# Patient Record
Sex: FEMALE | Race: BLACK OR AFRICAN AMERICAN | Employment: UNEMPLOYED | URBAN - METROPOLITAN AREA
[De-identification: names, ages, dates, MRNs, and addresses within clinical notes are randomized per-mention and may not be internally consistent; named-entity substitution may affect disease eponyms.]

---

## 2020-02-29 ENCOUNTER — HOSPITAL ENCOUNTER (EMERGENCY)
Age: 25
Discharge: HOME | End: 2020-02-29
Attending: EMERGENCY MEDICINE

## 2020-02-29 VITALS
RESPIRATION RATE: 14 BRPM | DIASTOLIC BLOOD PRESSURE: 73 MMHG | TEMPERATURE: 97.8 F | OXYGEN SATURATION: 100 % | SYSTOLIC BLOOD PRESSURE: 133 MMHG | HEART RATE: 73 BPM

## 2020-02-29 LAB
BACTERIA WET PREP: ABNORMAL
BILIRUBIN URINE: NEGATIVE
BLOOD, URINE: NEGATIVE
CLARITY: CLEAR
CLUE CELLS: ABNORMAL
COLOR: YELLOW
EPITHELIAL CELLS WET PREP: ABNORMAL
GLUCOSE URINE: NEGATIVE MG/DL
HCG(URINE) PREGNANCY TEST: NEGATIVE
KETONES, URINE: NEGATIVE MG/DL
LEUKOCYTE ESTERASE, URINE: NEGATIVE
MICROSCOPIC EXAMINATION: NORMAL
NITRITE, URINE: NEGATIVE
PH UA: 8 (ref 5–8)
PROTEIN UA: NEGATIVE MG/DL
RBC WET PREP: ABNORMAL
SOURCE WET PREP: ABNORMAL
SPECIFIC GRAVITY UA: 1.01 (ref 1–1.03)
TRICHOMONAS PREP: ABNORMAL
URINE REFLEX TO CULTURE: NORMAL
URINE TYPE: NORMAL
UROBILINOGEN, URINE: 0.2 E.U./DL
WBC WET PREP: ABNORMAL
YEAST WET PREP: ABNORMAL

## 2020-02-29 PROCEDURE — 96372 THER/PROPH/DIAG INJ SC/IM: CPT

## 2020-02-29 PROCEDURE — 84703 CHORIONIC GONADOTROPIN ASSAY: CPT

## 2020-02-29 PROCEDURE — 81003 URINALYSIS AUTO W/O SCOPE: CPT

## 2020-02-29 PROCEDURE — 6360000002 HC RX W HCPCS: Performed by: EMERGENCY MEDICINE

## 2020-02-29 PROCEDURE — 6370000000 HC RX 637 (ALT 250 FOR IP): Performed by: EMERGENCY MEDICINE

## 2020-02-29 PROCEDURE — 87591 N.GONORRHOEAE DNA AMP PROB: CPT

## 2020-02-29 PROCEDURE — 87210 SMEAR WET MOUNT SALINE/INK: CPT

## 2020-02-29 PROCEDURE — 99283 EMERGENCY DEPT VISIT LOW MDM: CPT

## 2020-02-29 PROCEDURE — 87491 CHLMYD TRACH DNA AMP PROBE: CPT

## 2020-02-29 RX ORDER — METRONIDAZOLE 500 MG/1
500 TABLET ORAL 2 TIMES DAILY
Qty: 14 TABLET | Refills: 0 | Status: SHIPPED | OUTPATIENT
Start: 2020-02-29 | End: 2020-03-07

## 2020-02-29 RX ORDER — CEFTRIAXONE SODIUM 250 MG/1
250 INJECTION, POWDER, FOR SOLUTION INTRAMUSCULAR; INTRAVENOUS ONCE
Status: COMPLETED | OUTPATIENT
Start: 2020-02-29 | End: 2020-02-29

## 2020-02-29 RX ORDER — AZITHROMYCIN 250 MG/1
1000 TABLET, FILM COATED ORAL ONCE
Status: COMPLETED | OUTPATIENT
Start: 2020-02-29 | End: 2020-02-29

## 2020-02-29 RX ADMIN — CEFTRIAXONE SODIUM 250 MG: 250 INJECTION, POWDER, FOR SOLUTION INTRAMUSCULAR; INTRAVENOUS at 11:06

## 2020-02-29 RX ADMIN — AZITHROMYCIN MONOHYDRATE 1000 MG: 250 TABLET ORAL at 11:05

## 2020-02-29 ASSESSMENT — PAIN DESCRIPTION - ORIENTATION: ORIENTATION: LOWER

## 2020-02-29 ASSESSMENT — PAIN DESCRIPTION - PAIN TYPE: TYPE: ACUTE PAIN

## 2020-02-29 ASSESSMENT — PAIN DESCRIPTION - LOCATION: LOCATION: ABDOMEN

## 2020-02-29 ASSESSMENT — PAIN SCALES - GENERAL: PAINLEVEL_OUTOF10: 3

## 2020-02-29 NOTE — ED NOTES
Patient states that she is constipated. She states that her last BM was about a week ago. She states that she tried to take two capfuls of Miralax but doesn't think that she finished the whole thing because she didn't like it. She states that she would also like to be tested for STD's. When asked if she was having any symptoms she states that she has been lower abdominal pain.        Merlin Rigg, RN  02/29/20 2696

## 2020-02-29 NOTE — ED PROVIDER NOTES
CHIEF COMPLAINT  Constipation; Abdominal Pain; and Other (Wants to be tested for STD)      HISTORY OF PRESENT ILLNESS  Glenys Collet is a 22 y.o. female, who presents to the ED with a history of chronic intermittent constipation which began several months ago. Patient states she has not had a bowel movement in 1 week, although she has been passing gas. Patient has occasional nausea no vomiting. Patient notes suprapubic and left lower quadrant abdominal pain during periods of constipation which resolve when she has a bowel movement. Patient states that MiraLAX does relieve the constipation but then it recurs. Patient is also requesting check for STD. Patient has noted for the past 4 days vaginal itching mild in severity without odor, dysuria, hematuria, frequency, urgency, flank pain, back pain, fever patient is sexually active with a single male sexual partner last menstrual period was 2 weeks ago she does not use birth control or barrier protection she has a history of chlamydia at age 17 years she is [de-identified], P0. Review of Systems    I have reviewed the following from the nursing documentation. History reviewed. No pertinent past medical history. History reviewed. No pertinent surgical history. History reviewed. No pertinent family history.   Social History     Socioeconomic History    Marital status: Single     Spouse name: Not on file    Number of children: Not on file    Years of education: Not on file    Highest education level: Not on file   Occupational History    Not on file   Social Needs    Financial resource strain: Not on file    Food insecurity:     Worry: Not on file     Inability: Not on file    Transportation needs:     Medical: Not on file     Non-medical: Not on file   Tobacco Use    Smoking status: Current Every Day Smoker     Packs/day: 0.50     Types: Cigarettes    Smokeless tobacco: Never Used   Substance and Sexual Activity    Alcohol use: Yes     Comment: occas    Drug Performed at:  [facility]  [address],  [place] [facility] [place]   Phone [phone]   C. TRACHOMATIS N.GONORRHOEAE DNA   URINE RT REFLEX TO CULTURE    Narrative:     Performed at:  [facility]  [address] [facility] [place]   Phone [phone]   PREGNANCY, URINE    Narrative:     Performed at:  [facility]  [address] [facility] [place]   Phone [phone]  No orders to display       If EKG done, EKG was interpreted independently by me    PROCEDURES  Procedures    EDCOURSE/MDM  Patient seen and evaluated. Old records selectively reviewed if pertinent. Labs and imaging reviewed and results discussed with patient. I considered STD, UTI, vaginitis, bacterial vaginosis, chronic constipation. Discussion held with patient regarding treatment options for trichomoniasis. She wished treatment for gonorrhea and chlamydia as well. I will provide her with a prescription for Flagyl 500 mg twice a day for 1 week which will treat trichomoniasis as well as bacterial vaginosis which is also noted on the wet prep. The patient was given instructions regarding constipation, and advised to follow-up in the [facility]. outpatient clinic for further evaluation and management. She understands the need for follow-up. If Mia Villegas is discharged, I discussed with Mia Villegas and/or family the exam results, diagnosis, care, prognosis, reasons to return and the importance of follow up. Patient and/or family is in agreement with plan and all questions have been answered. Specific discharge instructions explained, including reasons to return to the emergency department. If discharged, patient was given scripts for the following medications.     New Prescriptions    METRONIDAZOLE (FLAGYL) 500 MG TABLET    Take 1 tablet by mouth 2 times daily for 7 days       CLINICAL IMPRESSION  1. Trichomonal vaginitis    2. Constipation, unspecified constipation type        /73   Pulse 73   Temp 97.8 °F (36.6 °C) (Oral)   Resp 14   LMP 02/15/2020   SpO2 100%     DISPOSITION  Vic Somers was discharged in stable condition.                    Nallely Guaman MD  02/29/20 111

## 2020-03-02 LAB
C TRACH DNA GENITAL QL NAA+PROBE: NEGATIVE
N. GONORRHOEAE DNA: NEGATIVE

## 2020-05-11 ENCOUNTER — HOSPITAL ENCOUNTER (EMERGENCY)
Age: 25
Discharge: HOME | End: 2020-05-11
Payer: COMMERCIAL

## 2020-05-11 VITALS
HEART RATE: 84 BPM | OXYGEN SATURATION: 99 % | WEIGHT: 196 LBS | DIASTOLIC BLOOD PRESSURE: 84 MMHG | TEMPERATURE: 98.1 F | SYSTOLIC BLOOD PRESSURE: 133 MMHG | HEIGHT: 64 IN | RESPIRATION RATE: 12 BRPM | BODY MASS INDEX: 33.46 KG/M2

## 2020-05-11 PROCEDURE — 99282 EMERGENCY DEPT VISIT SF MDM: CPT

## 2020-05-11 RX ORDER — CEPHALEXIN 500 MG/1
500 CAPSULE ORAL 2 TIMES DAILY
Qty: 10 CAPSULE | Refills: 0 | Status: SHIPPED | OUTPATIENT
Start: 2020-05-11 | End: 2020-05-16

## 2020-05-12 NOTE — ED NOTES
Pt dc/d with instructions, rx's and work note in stable condition, ambulatory to lobby. Home per ride.       Alexandra Almanza RN  05/11/20 2005

## 2020-06-28 ENCOUNTER — HOSPITAL ENCOUNTER (EMERGENCY)
Age: 25
Discharge: HOME | End: 2020-06-28
Attending: EMERGENCY MEDICINE

## 2020-06-28 VITALS
OXYGEN SATURATION: 100 % | SYSTOLIC BLOOD PRESSURE: 122 MMHG | BODY MASS INDEX: 34.01 KG/M2 | RESPIRATION RATE: 20 BRPM | TEMPERATURE: 97.6 F | DIASTOLIC BLOOD PRESSURE: 77 MMHG | WEIGHT: 199.2 LBS | HEART RATE: 59 BPM | HEIGHT: 64 IN

## 2020-06-28 LAB
BACTERIA WET PREP: ABNORMAL
BACTERIA: ABNORMAL /HPF
BILIRUBIN URINE: NEGATIVE
BLOOD, URINE: NEGATIVE
CLARITY: ABNORMAL
CLUE CELLS: ABNORMAL
COLOR: YELLOW
EPITHELIAL CELLS WET PREP: ABNORMAL
EPITHELIAL CELLS, UA: ABNORMAL /HPF (ref 0–5)
GLUCOSE URINE: NEGATIVE MG/DL
HCG(URINE) PREGNANCY TEST: NEGATIVE
KETONES, URINE: NEGATIVE MG/DL
LEUKOCYTE ESTERASE, URINE: ABNORMAL
MICROSCOPIC EXAMINATION: YES
NITRITE, URINE: NEGATIVE
PH UA: 7.5 (ref 5–8)
PROTEIN UA: NEGATIVE MG/DL
RBC UA: ABNORMAL /HPF (ref 0–4)
RBC WET PREP: ABNORMAL
SOURCE WET PREP: ABNORMAL
SPECIFIC GRAVITY UA: 1.01 (ref 1–1.03)
TRICHOMONAS PREP: ABNORMAL
URINE REFLEX TO CULTURE: ABNORMAL
URINE TYPE: ABNORMAL
UROBILINOGEN, URINE: 0.2 E.U./DL
WBC UA: ABNORMAL /HPF (ref 0–5)
WBC WET PREP: ABNORMAL
YEAST WET PREP: ABNORMAL

## 2020-06-28 PROCEDURE — 87210 SMEAR WET MOUNT SALINE/INK: CPT

## 2020-06-28 PROCEDURE — 87591 N.GONORRHOEAE DNA AMP PROB: CPT

## 2020-06-28 PROCEDURE — 87491 CHLMYD TRACH DNA AMP PROBE: CPT

## 2020-06-28 PROCEDURE — 99283 EMERGENCY DEPT VISIT LOW MDM: CPT

## 2020-06-28 PROCEDURE — 84703 CHORIONIC GONADOTROPIN ASSAY: CPT

## 2020-06-28 PROCEDURE — 81001 URINALYSIS AUTO W/SCOPE: CPT

## 2020-06-28 RX ORDER — METRONIDAZOLE 500 MG/1
500 TABLET ORAL 2 TIMES DAILY
Qty: 14 TABLET | Refills: 0 | Status: SHIPPED | OUTPATIENT
Start: 2020-06-28 | End: 2020-07-05

## 2020-06-28 NOTE — ED PROVIDER NOTES
member of club or organization: Not on file     Attends meetings of clubs or organizations: Not on file     Relationship status: Not on file    Intimate partner violence     Fear of current or ex partner: Not on file     Emotionally abused: Not on file     Physically abused: Not on file     Forced sexual activity: Not on file   Other Topics Concern    Not on file   Social History Narrative    Not on file     No current facility-administered medications for this encounter. Current Outpatient Medications   Medication Sig Dispense Refill    metroNIDAZOLE (FLAGYL) 500 MG tablet Take 1 tablet by mouth 2 times daily for 7 days 14 tablet 0     No Known Allergies       PHYSICAL EXAM  /77   Pulse 59   Temp 97.6 °F (36.4 °C) (Oral)   Resp 20   Ht 5' 4\" (1.626 m)   Wt 90.4 kg (199 lb 3.2 oz)   SpO2 100%   BMI 34.19 kg/m²   Physical Exam   GENERAL APPEARANCE: Awake and alert. Cooperative. In no acute distress. EYES: PERRL. Corneas clear. Sclera non icteric. No conjunctival injection  LUNGS: Clear. Equal breath sounds bilaterally. CARDIOVASCULAR: RRR. No murmurs rubs or gallops. ABDOMEN: Soft non tender. No guarding or rebound. EXTREMITIES:  Moves all extremities equally. SKIN: Warm and dry.          LABORATORY STUDIES:   Labs Reviewed   WET PREP, GENITAL - Abnormal; Notable for the following components:       Result Value    Clue Cells, Wet Prep 3+ (*)     All other components within normal limits    Narrative:     Performed at:  [facility]  [employer],  [place]   Phone [phone]   URINE RT REFLEX TO CULTURE - Abnormal; Notable for the following components:    Clarity, UA SL CLOUDY (*)     Leukocyte Esterase, Urine SMALL (*)     All other components within normal limits    Narrative:     Performed at:  [facility]  [employer] [id number] [address],  [place]   Phone [phone]   MICROSCOPIC

## 2020-06-29 LAB
C TRACH DNA GENITAL QL NAA+PROBE: POSITIVE
N. GONORRHOEAE DNA: NEGATIVE

## 2020-09-28 ENCOUNTER — HOSPITAL ENCOUNTER (EMERGENCY)
Age: 25
Discharge: HOME | End: 2020-09-28
Attending: EMERGENCY MEDICINE

## 2020-09-28 VITALS
SYSTOLIC BLOOD PRESSURE: 117 MMHG | BODY MASS INDEX: 34.23 KG/M2 | WEIGHT: 199.4 LBS | DIASTOLIC BLOOD PRESSURE: 87 MMHG | OXYGEN SATURATION: 99 % | HEART RATE: 77 BPM | RESPIRATION RATE: 15 BRPM | TEMPERATURE: 99 F

## 2020-09-28 PROCEDURE — 99281 EMR DPT VST MAYX REQ PHY/QHP: CPT

## 2020-09-28 RX ORDER — VALACYCLOVIR HYDROCHLORIDE 500 MG/1
500 TABLET, FILM COATED ORAL 2 TIMES DAILY
Qty: 6 TABLET | Refills: 1 | Status: SHIPPED | OUTPATIENT
Start: 2020-09-28 | End: 2020-10-01

## 2020-09-28 ASSESSMENT — PAIN SCALES - GENERAL: PAINLEVEL_OUTOF10: 10

## 2020-09-28 ASSESSMENT — PAIN DESCRIPTION - LOCATION: LOCATION: VAGINA

## 2020-09-28 NOTE — ED PROVIDER NOTES
and OX3. Speech clear and appropriate. No extremity weakness. Normal sensation in all extremities. No facial asymmetry. Gait normal.  Psych:   Affect normal. Mood normal  :   Pelvic exam was deferred by the patient      RADIOLOGY:      LAB      ED COURSE / MDM:  23-year-old female with previous history of vaginal herpes presents with complaints of vaginal pain secondary to vaginal herpes outbreak that started 2 days ago. She was seen last week at [facility] with pelvic pain and UTI symptoms but no discharge. She did test positive for gonorrhea and chlamydia. Urinalysis showed some pyuria. She was treated with Rocephin and azithromycin and given a prescription for Macrobid. Symptoms have the vaginal herpes started 2 days ago. She declines pelvic exam.  She was given a prescription for a 3-day course of Valtrex with 1 refill in case she has recurrent outbreaks symptoms. Advise follow-up with GYN or 1 of the local STD clinics. I discussed with Fernando Berumen the results of evaluation in the Emergency Department, diagnosis, care and prognosis. The plan is to discharge to home. The patient is in agreement with the plan and questions have been answered. I also discussed with the patient and/or family the reasons which may require a return visit and the importance of follow-up care.        (Please note that portions of this note may have been completed with a voice recognition program.  Efforts were made to edit the dictation but occasionally words are mis-transcribed)        FINAL IMPRESSION:  1 --HSV infection of the vagina                   Timothy Sandoval MD  09/28/20 0525

## 2020-09-28 NOTE — ED NOTES
Pt states understanding of d/c instructions and medications. Pt alert and oriented with steady gait upon discharge.       Anna Parra RN  09/28/20 1252

## 2020-10-19 ENCOUNTER — HOSPITAL ENCOUNTER (EMERGENCY)
Age: 25
Discharge: HOME | End: 2020-10-19
Attending: EMERGENCY MEDICINE
Payer: MEDICAID

## 2020-10-19 VITALS
RESPIRATION RATE: 16 BRPM | SYSTOLIC BLOOD PRESSURE: 137 MMHG | BODY MASS INDEX: 34.4 KG/M2 | TEMPERATURE: 97.8 F | WEIGHT: 200.4 LBS | DIASTOLIC BLOOD PRESSURE: 81 MMHG | HEART RATE: 103 BPM | OXYGEN SATURATION: 97 %

## 2020-10-19 LAB
BACTERIA WET PREP: NORMAL
CLUE CELLS: NORMAL
EPITHELIAL CELLS WET PREP: NORMAL
RBC WET PREP: NORMAL
SOURCE WET PREP: NORMAL
TRICHOMONAS PREP: NORMAL
WBC WET PREP: NORMAL
YEAST WET PREP: NORMAL

## 2020-10-19 PROCEDURE — 6360000002 HC RX W HCPCS: Performed by: EMERGENCY MEDICINE

## 2020-10-19 PROCEDURE — 99283 EMERGENCY DEPT VISIT LOW MDM: CPT

## 2020-10-19 PROCEDURE — 96372 THER/PROPH/DIAG INJ SC/IM: CPT

## 2020-10-19 PROCEDURE — 87591 N.GONORRHOEAE DNA AMP PROB: CPT

## 2020-10-19 PROCEDURE — 87491 CHLMYD TRACH DNA AMP PROBE: CPT

## 2020-10-19 PROCEDURE — 87210 SMEAR WET MOUNT SALINE/INK: CPT

## 2020-10-19 RX ORDER — METRONIDAZOLE 500 MG/1
2000 TABLET ORAL ONCE
Qty: 4 TABLET | Refills: 0 | Status: SHIPPED | OUTPATIENT
Start: 2020-10-19 | End: 2020-10-19

## 2020-10-19 RX ORDER — CEFTRIAXONE SODIUM 250 MG/1
250 INJECTION, POWDER, FOR SOLUTION INTRAMUSCULAR; INTRAVENOUS ONCE
Status: COMPLETED | OUTPATIENT
Start: 2020-10-19 | End: 2020-10-19

## 2020-10-19 RX ORDER — AZITHROMYCIN 250 MG/1
1000 TABLET, FILM COATED ORAL ONCE
Qty: 4 TABLET | Refills: 0 | Status: SHIPPED | OUTPATIENT
Start: 2020-10-19 | End: 2020-10-19

## 2020-10-19 RX ADMIN — CEFTRIAXONE SODIUM 250 MG: 250 INJECTION, POWDER, FOR SOLUTION INTRAMUSCULAR; INTRAVENOUS at 02:39

## 2020-10-19 NOTE — ED NOTES
Pt to ed with c/o retained condom x 25 min. Tonight.  Exam per MD.      Cory Davenport, RN  10/19/20 9119

## 2020-10-19 NOTE — ED PROVIDER NOTES
[facility]   eMERGENCY dEPARTMENT eNCOUnter      Pt Name: Petar Antoine  MRN: [id number]  [place] 1995  Date of evaluation: 10/19/2020  Provider: Sukhdeep Hunt MD  PCP: No primary care provider on file. CHIEF COMPLAINT       Chief Complaint   Patient presents with    Foreign Body     retained condom       HISTORY OFPRESENT ILLNESS   (Location/Symptom, Timing/Onset, Context/Setting, Quality, Duration, Modifying Factors,Severity)  Note limiting factors. Petar Antoine is a 26 y.o. female she was having intercourse condom came off this happened about 20 to 30 minutes ago she cannot reach it she is also requesting STD prophylaxis    Nursing Notes were all reviewed and agreed with or any disagreements were addressed  in the HPI. REVIEW OF SYSTEMS    (2-9 systems for level 4, 10 or more for level 5)     Review of Systems    Positives and Pertinent negatives as per HPI. Except as noted above in the ROS, all other systems were reviewed andnegative. PASTMEDICAL HISTORY     Past Medical History:   Diagnosis Date    Genital herpes          SURGICAL HISTORY       Past Surgical History:   Procedure Laterality Date    TONSILLECTOMY           CURRENT MEDICATIONS       Previous Medications    No medications on file       ALLERGIES     Patient has no known allergies. FAMILY HISTORY     No family history on file.        SOCIAL HISTORY       Social History     Socioeconomic History    Marital status: Single     Spouse name: Not on file    Number of children: Not on file    Years of education: Not on file    Highest education level: Not on file   Occupational History    Not on file   Social Needs    Financial resource strain: Not on file    Food insecurity     Worry: Not on file     Inability: Not on file    Transportation needs     Medical: Not on file     Non-medical: Not on file   Tobacco Use    Smoking status: Current Every Day Smoker     Packs/day: 0.50     Types: Cigarettes    Smokeless tobacco: Never Used   Substance and Sexual Activity    Alcohol use: Yes     Comment: occas    Drug use: Yes     Types: Marijuana    Sexual activity: Yes     Partners: Male   Lifestyle    Physical activity     Days per week: Not on file     Minutes per session: Not on file    Stress: Not on file   Relationships    Social connections     Talks on phone: Not on file     Gets together: Not on file     Attends Christian service: Not on file     Active member of club or organization: Not on file     Attends meetings of clubs or organizations: Not on file     Relationship status: Not on file    Intimate partner violence     Fear of current or ex partner: Not on file     Emotionally abused: Not on file     Physically abused: Not on file     Forced sexual activity: Not on file   Other Topics Concern    Not on file   Social History Narrative    Not on file       SCREENINGS      @SPBW([id number])@      PHYSICAL EXAM    (up to 7 for level 4, 8 or more for level 5)     ED Triage Vitals [10/19/20 0228]   BP Temp Temp Source Pulse Resp SpO2 Height Weight   137/81 97.8 °F (36.6 °C) Oral 103 16 97 % -- 200 lb 6.4 oz (90.9 kg)       Physical Exam      General Appearance:  Alert, cooperative, no distress, appears stated age. Head:  Normocephalic, without obviousabnormality, atraumatic. Eyes:  conjunctiva/corneas clear, EOM's intact. Sclera anicteric. ENT: Mucous membranes moist.   Neck: Supple, symmetrical, trachea midline, no adenopathy. No jugular venous distention. Lungs:   Clear to auscultation bilaterally, respirationsunlabored. No rales, rhonchi or wheezes. Chest Wall:  No tenderness. Heart:  Regular rate and rhythm, S1 and S2 normal, no murmur, rub or gallop. Abdomen:   Soft, non-tender, bowel sounds active,   no masses, no organomegaly.   Pelvic exam there is a condom located right at the entrance to the cervix it appears to be in one piece   Extremities: No edema, cords or calf tenderness. Full range of motion. Pulses: 2+ and symmetric   Skin: Turgor is normal, no rashes or lesions. Neurologic: Alert and oriented X 3. No focal findings. Motor grossly normal.  Speech clear, no drift, CN III-XII grossly intact,        DIAGNOSTIC RESULTS   LABS:    Labs Reviewed   WET PREP, GENITAL   C.TRACHOMATIS N.GONORRHOEAE DNA       All other labs were within normal range or not returned as of this dictation. EKG: All EKG's are interpreted by the Emergency Department Physician who eithersigns or Co-signs this chart in the absence of a cardiologist.        RADIOLOGY:   Non-plain film images such as CT, Ultrasound and MRI are read by the radiologist. Plain radiographic images are visualized by myself. *    Interpretation per the Radiologist below, if available at the time of this note:    No orders to display         PROCEDURES   Unless otherwise noted below, none     Procedures  Using ring forceps the foreign body was removed the patient tolerated the procedure well  *    CRITICAL CARE TIME   N/A      EMERGENCY DEPARTMENT COURSE and DIFFERENTIALDIAGNOSIS/MDM:   Vitals:    Vitals:    10/19/20 0224   BP: 137/81   Pulse: 103   Resp: 16   Temp: 97.8 °F (36.6 °C)   TempSrc: Oral   SpO2: 97%   Weight: 200 lb 6.4 oz (90.9 kg)       Patient was given thefollowing medications:  Medications   cefTRIAXone (ROCEPHIN) injection 250 mg (has no administration in time range)           The patient tolerated their visit well. The patient and / or the familywere informed of the results of any tests, a time was given to answer questions. FINAL IMPRESSION      1.  History of retained foreign body fully removed          DISPOSITION/PLAN   DISPOSITION Decision To Discharge 10/19/2020 02:30:09 AM      PATIENT REFERRED TO:  [facility]      As needed      DISCHARGE MEDICATIONS:  New Prescriptions    AZITHROMYCIN (ZITHROMAX) 250 MG TABLET    Take 4 tablets by mouth once for 1 dose METRONIDAZOLE (FLAGYL) 500 MG TABLET    Take 4 tablets by mouth once for 1 dose Do not drink alcohol 4 days before to 4 days after taking.        DISCONTINUED MEDICATIONS:  Discontinued Medications    No medications on file              (Please note that portions of this note were completed with a voice recognition program.  Efforts were made to edit the dictations but occasionally words are mis-transcribed.)    Cori Dove MD (electronically signed)      Cori Dove MD  10/19/20 0139

## 2020-10-19 NOTE — ED NOTES
Pt dc/d with instructions and rx's , ambulatory to lobby. Home per ride.      Lisa Padilla RN  10/19/20 6202

## 2020-10-21 LAB
C TRACH DNA GENITAL QL NAA+PROBE: NEGATIVE
N. GONORRHOEAE DNA: NEGATIVE

## 2020-12-24 ENCOUNTER — HOSPITAL ENCOUNTER (EMERGENCY)
Age: 25
Discharge: HOME | End: 2020-12-24
Attending: EMERGENCY MEDICINE
Payer: MEDICAID

## 2020-12-24 VITALS
DIASTOLIC BLOOD PRESSURE: 77 MMHG | BODY MASS INDEX: 35.68 KG/M2 | HEIGHT: 64 IN | WEIGHT: 209 LBS | RESPIRATION RATE: 16 BRPM | SYSTOLIC BLOOD PRESSURE: 133 MMHG | OXYGEN SATURATION: 100 % | HEART RATE: 77 BPM | TEMPERATURE: 98.9 F

## 2020-12-24 PROCEDURE — 99283 EMERGENCY DEPT VISIT LOW MDM: CPT

## 2020-12-24 RX ORDER — PRENATAL VIT/IRON FUM/FOLIC AC 27MG-0.8MG
TABLET ORAL
COMMUNITY
Start: 2020-12-19 | End: 2021-12-31 | Stop reason: ALTCHOICE

## 2020-12-24 ASSESSMENT — PAIN DESCRIPTION - ORIENTATION
ORIENTATION: LOWER
ORIENTATION: LOWER

## 2020-12-24 ASSESSMENT — PAIN DESCRIPTION - PAIN TYPE
TYPE: ACUTE PAIN
TYPE: ACUTE PAIN

## 2020-12-24 ASSESSMENT — PAIN DESCRIPTION - LOCATION
LOCATION: ABDOMEN
LOCATION: ABDOMEN

## 2020-12-24 ASSESSMENT — PAIN DESCRIPTION - DESCRIPTORS: DESCRIPTORS: CRAMPING

## 2020-12-24 ASSESSMENT — PAIN SCALES - GENERAL
PAINLEVEL_OUTOF10: 3
PAINLEVEL_OUTOF10: 4

## 2020-12-24 ASSESSMENT — PAIN - FUNCTIONAL ASSESSMENT: PAIN_FUNCTIONAL_ASSESSMENT: 0-10

## 2020-12-24 NOTE — ED PROVIDER NOTES
[address] [facility]    CHIEF COMPLAINT  Abdominal Pain (pt reports 7 weeks pregnant. started with abd cramping on . seen at [facility] er , dx with BV. pt completed Flagyl. did not get checked for std. +white vag d/c +itching. pt sts has had chlamydia and sx's feel the same. -fever +nausea -vomiting -uti sx's. has 1st ob appt )       HISTORY OF PRESENT ILLNESS  Funmilayo Modi is a 26 y.o. female  who presents to the ED with request for STI testing. Of note, the patient was seen in the emergency department at [facility] 2020. Chart review reveals she was also seen at [facility] emergency department on  at which time she was discharged with prenatal vitamins and Flagyl for bacterial vaginosis. Her testing at that time was negative for trichomonas, yeast, gonorrhea, chlamydia. The patient states that she was unaware that this testing was performed. Despite the triage note, the patient is adamant that she is not having any vaginal discharge or itching. She denies fever, chest pain, shortness of breath. She has had mild nausea during the course of this pregnancy but denies vomiting or diarrhea. She has had mild bilateral crampy abdominal pain of the lower abdomen which is unchanged and in fact improved since her initial emergency department visit. She denies vaginal bleeding, leaking, or gush of fluid. She underwent transvaginal ultrasound 2020 that did not show a definitive intrauterine pregnancy. The gestational sac was suspected radiographically but without yolk sac was not definitive for diagnosis. No adnexal masses were noted. The uterus and cervix were otherwise unremarkable in appearance. There is no appreciable fluid in the cul-de-sac. After the recent presentation to [facility] she did call [facility] to arrange for follow-up with OB/GYN. She was informed to schedule a follow-up ultrasound anytime after  and before 2021.   She reports she is scheduled to follow with OB on January 5th and states she has an ultrasound scheduled for January 6. No other complaints, modifying factors or associated symptoms. I have reviewed the following from the nursing documentation:    Past Medical History:   Diagnosis Date    Genital herpes      Past Surgical History:   Procedure Laterality Date    TONSILLECTOMY       History reviewed. No pertinent family history.   Social History     Socioeconomic History    Marital status: Single     Spouse name: Not on file    Number of children: Not on file    Years of education: Not on file    Highest education level: Not on file   Occupational History    Not on file   Social Needs    Financial resource strain: Not on file    Food insecurity     Worry: Not on file     Inability: Not on file    Transportation needs     Medical: Not on file     Non-medical: Not on file   Tobacco Use    Smoking status: Former Smoker     Packs/day: 0.50     Types: Cigarettes    Smokeless tobacco: Never Used   Substance and Sexual Activity    Alcohol use: Not Currently     Comment: occas    Drug use: Not Currently     Types: Marijuana    Sexual activity: Yes     Partners: Male   Lifestyle    Physical activity     Days per week: Not on file     Minutes per session: Not on file    Stress: Not on file   Relationships    Social connections     Talks on phone: Not on file     Gets together: Not on file     Attends Mandaeism service: Not on file     Active member of club or organization: Not on file     Attends meetings of clubs or organizations: Not on file     Relationship status: Not on file    Intimate partner violence     Fear of current or ex partner: Not on file     Emotionally abused: Not on file     Physically abused: Not on file     Forced sexual activity: Not on file   Other Topics Concern    Not on file   Social History Narrative    Not on file     No current facility-administered medications for this

## 2021-03-09 ENCOUNTER — HOSPITAL ENCOUNTER (EMERGENCY)
Age: 26
Discharge: HOME | End: 2021-03-09
Attending: EMERGENCY MEDICINE
Payer: COMMERCIAL

## 2021-03-09 VITALS
HEART RATE: 90 BPM | SYSTOLIC BLOOD PRESSURE: 130 MMHG | DIASTOLIC BLOOD PRESSURE: 76 MMHG | RESPIRATION RATE: 16 BRPM | HEIGHT: 64 IN | BODY MASS INDEX: 36.19 KG/M2 | WEIGHT: 212 LBS | TEMPERATURE: 98.3 F | OXYGEN SATURATION: 100 %

## 2021-03-09 DIAGNOSIS — Z3A.17 17 WEEKS GESTATION OF PREGNANCY: ICD-10-CM

## 2021-03-09 DIAGNOSIS — R10.30 LOWER ABDOMINAL PAIN: Primary | ICD-10-CM

## 2021-03-09 LAB
AMORPHOUS: ABNORMAL /HPF
BACTERIA: ABNORMAL /HPF
BILIRUBIN URINE: ABNORMAL
BLOOD, URINE: NEGATIVE
CLARITY: ABNORMAL
COLOR: YELLOW
EPITHELIAL CELLS, UA: ABNORMAL /HPF (ref 0–5)
GLUCOSE URINE: NEGATIVE MG/DL
HCG(URINE) PREGNANCY TEST: POSITIVE
KETONES, URINE: NEGATIVE MG/DL
LEUKOCYTE ESTERASE, URINE: NEGATIVE
MICROSCOPIC EXAMINATION: YES
MUCUS: ABNORMAL /LPF
NITRITE, URINE: NEGATIVE
PH UA: 6 (ref 5–8)
PROTEIN UA: 30 MG/DL
RBC UA: ABNORMAL /HPF (ref 0–4)
SPECIFIC GRAVITY UA: >=1.03 (ref 1–1.03)
URINE TYPE: ABNORMAL
UROBILINOGEN, URINE: 1 E.U./DL
WBC UA: ABNORMAL /HPF (ref 0–5)

## 2021-03-09 PROCEDURE — 99283 EMERGENCY DEPT VISIT LOW MDM: CPT

## 2021-03-09 PROCEDURE — 81001 URINALYSIS AUTO W/SCOPE: CPT

## 2021-03-09 PROCEDURE — 84703 CHORIONIC GONADOTROPIN ASSAY: CPT

## 2021-03-09 ASSESSMENT — PAIN SCALES - GENERAL: PAINLEVEL_OUTOF10: 6

## 2021-03-09 NOTE — ED NOTES
Patient given prescription,  discharge instructions verbal and written, patient verbalized understanding. Alert/oriented X4, Clear speech.   Patient exhibits no distress, ambulates with steady gait per self leaving unit, no further request.     Joel Bowen RN  03/09/21 1344

## 2021-03-09 NOTE — ED PROVIDER NOTES
[facility] CHIEF COMPLAINT:   Chief Complaint   Patient presents with    Abdominal Pain     pt states that she started with left sided abd pain 2 days ago and pt is 17 weeks pregnant and had apt on the 2nd and fetal heart tones were good         HPI: Gela Tenorio is a 26 y.o. female who presents to the Emergency Department with complaint of abdominal pain mostly left-sided that started about 2 days ago. The pain comes and goes. She does not currently have the pain. The patient is approximately 17 weeks and 3 days pregnant. She is G1, P0. She has had some low-grade nausea throughout her pregnancy. She did vomit twice yesterday but has no nausea or vomiting today. She thinks it may be related to something she ate. Denies diarrhea or constipation. She has no vaginal bleeding. Denies any abnormal discharge. Denies UTI symptoms. She is feeling fetal movement. Denies chest pain or shortness of breath. No fever or chills. No cough or URI symptoms. Denies any flank pain. The patient was seen by her OB doctor at the [address] [place] [address] clinic on March 2. At that time she had fetal heart tones and was 16 weeks 3 days by dates. She is scheduled for an ultrasound to be done on April 2. REVIEW OF SYSTEMS:  6 systems reviewed. Pertinent positives per HPI. Otherwise noted to be negative. Nursing notes reviewed and agree with above. Past medical/surgical history reviewed. MEDICATIONS   Patient's Medications   New Prescriptions    No medications on file   Previous Medications    PRENATAL VIT-FE FUMARATE-FA (PRENATAL VITAMIN) 27-0.8 MG TABS    TAKE 1 TABLET BY MOUTH EVERY DAY   Modified Medications    No medications on file   Discontinued Medications    No medications on file         ALLERGIES No Known Allergies      /76   Pulse 90   Temp 98.3 °F (36.8 °C) (Oral)   Resp 16   Ht 5' 4\" (1.626 m)   Wt 212 lb (96.2 kg)   LMP 11/06/2020   SpO2 100%   BMI 36.39 kg/m²   General:  No acute distress.  Non toxic appearance  Head:   Normocephalic and atraumatic  Eyes:   Conjunctiva clear, GURWINDER, EOM's intact. Sclera anicteric. ENT:   Mucous membranes moist  Neck:   Supple. No adenopathy or jugular venous distension  Lungs/Chest:  No respiratory distress  CVS:   Regular rate and rhythm  Abdomen: Bowel sounds are normal.  Soft and nontender. Fetal heart tones are heard at 152. Patient is feeling fetal movement. Extremities:  Full range of motion  Skin:   No rashes or lesions to exposed skin  Back:   No CVA tenderness  Neuro:  Alert and OX3. Speech clear and appropriate. No upper/lower extremity weakness. Normal sensation in all extremities. No facial asymmetry or weakness. Gait normal.  Psych:   Affect normal. Mood normal        RADIOLOGY:      LAB      ED COURSE / MDM:  81-year-old female, G1, P0, approximately 17 weeks 3 days pregnant with complaints of some intermittent lower abdominal pain mostly left-sided that comes and goes. No current pain. No UTI symptoms/flank pain, vaginal bleeding, fever/chills or bowel complaint. No abnormal discharge. She has had some intermittent nausea throughout her pregnancy and did vomit twice yesterday but thinks it is from something she ate. No nausea or, vomiting or bowel complaint today. She is afebrile with normal vital signs. Abdomen is soft and nontender. No CVA tenderness. Fetal heart tones are heard at 152. Urinalysis was normal and UCG positive. Patient was reassured. She is scheduled for ultrasound on April 2 but I recommended she call her OB doctor to inform them of the pain and to get further instructions. I recommended Tylenol if needed for pain. This may represent round ligament pain. There is nothing to suggest surgical abdomen, bowel obstruction, ectopic pregnancy, TOA or torsion. I discussed with Carmen Arredondo the results of the evaluation in the Emergency Department, diagnosis, care, prognosis and the importance of follow-up.   The patient is stable for discharge. The patient and/or family are in agreement with the plan and all questions have been answered. Specific discharge instructions were explained, including reasons to return to the emergency department.       (Please note that portions of this note may have been completed with a voice recognition program.  Efforts were made to edit the dictation but occasionally words are mis-transcribed)        FINAL IMPRESSION:  1 --abdominal pain  2 --pregnancy 17 weeks                Bhakti Melendrez MD  03/09/21 3898

## 2021-06-03 ENCOUNTER — HOSPITAL ENCOUNTER (EMERGENCY)
Age: 26
Discharge: HOME | End: 2021-06-03
Attending: EMERGENCY MEDICINE
Payer: COMMERCIAL

## 2021-06-03 VITALS
TEMPERATURE: 98.2 F | SYSTOLIC BLOOD PRESSURE: 125 MMHG | HEART RATE: 92 BPM | DIASTOLIC BLOOD PRESSURE: 76 MMHG | RESPIRATION RATE: 18 BRPM | BODY MASS INDEX: 38.24 KG/M2 | WEIGHT: 224 LBS | HEIGHT: 64 IN | OXYGEN SATURATION: 98 %

## 2021-06-03 DIAGNOSIS — Z34.92 SECOND TRIMESTER PREGNANCY: ICD-10-CM

## 2021-06-03 DIAGNOSIS — R11.2 NON-INTRACTABLE VOMITING WITH NAUSEA, UNSPECIFIED VOMITING TYPE: Primary | ICD-10-CM

## 2021-06-03 LAB
BACTERIA: ABNORMAL /HPF
BILIRUBIN URINE: NEGATIVE
BLOOD, URINE: NEGATIVE
CLARITY: ABNORMAL
COLOR: YELLOW
CRYSTALS, UA: ABNORMAL /HPF
EPITHELIAL CELLS, UA: ABNORMAL /HPF (ref 0–5)
GLUCOSE URINE: NEGATIVE MG/DL
KETONES, URINE: ABNORMAL MG/DL
LEUKOCYTE ESTERASE, URINE: NEGATIVE
MICROSCOPIC EXAMINATION: YES
NITRITE, URINE: NEGATIVE
PH UA: 5.5 (ref 5–8)
PROTEIN UA: NEGATIVE MG/DL
RBC UA: ABNORMAL /HPF (ref 0–4)
SPECIFIC GRAVITY UA: >=1.03 (ref 1–1.03)
URINE TYPE: ABNORMAL
UROBILINOGEN, URINE: 0.2 E.U./DL
WBC UA: ABNORMAL /HPF (ref 0–5)

## 2021-06-03 PROCEDURE — 99283 EMERGENCY DEPT VISIT LOW MDM: CPT

## 2021-06-03 PROCEDURE — 6370000000 HC RX 637 (ALT 250 FOR IP): Performed by: EMERGENCY MEDICINE

## 2021-06-03 PROCEDURE — 81001 URINALYSIS AUTO W/SCOPE: CPT

## 2021-06-03 RX ORDER — ONDANSETRON 4 MG/1
4 TABLET, ORALLY DISINTEGRATING ORAL EVERY 8 HOURS PRN
Qty: 12 TABLET | Refills: 1 | Status: SHIPPED | OUTPATIENT
Start: 2021-06-03 | End: 2021-12-31 | Stop reason: ALTCHOICE

## 2021-06-03 RX ORDER — ONDANSETRON 4 MG/1
4 TABLET, ORALLY DISINTEGRATING ORAL ONCE
Status: COMPLETED | OUTPATIENT
Start: 2021-06-03 | End: 2021-06-03

## 2021-06-03 RX ADMIN — ONDANSETRON 4 MG: 4 TABLET, ORALLY DISINTEGRATING ORAL at 17:24

## 2021-06-03 NOTE — ED NOTES
Pt given a gatorade and told to give medication 5-10 minutes and then sip on the gatorade and see how she tolerates.       Sulaiman Mills RN  06/03/21 2449

## 2021-06-03 NOTE — ED NOTES
MD at bedside to evaluate pt and see if she was able to keep gatorade down.       Juanjo Pritchard, RN  06/03/21 6477

## 2021-06-03 NOTE — ED NOTES
Pt states that she is 7 months pregnant and started vomiting yesterday and today and per pt she has not been able to keep anything down and denies abdominal pain and called ob and was referred to er for evaluation.       Juanjo Pritchard RN  06/03/21 5117

## 2021-06-04 NOTE — ED PROVIDER NOTES
Head:   Normocephalic and atraumatic  Eyes:   Conjunctiva clear, GURWINDER, EOM's intact. Sclera anicteric. ENT:   Mucous membranes moist  Neck:   Supple. No adenopathy or jugular venous distension  Lungs/Chest:  No respiratory distress  CVS:   Regular rate and rhythm  Abdomen: Bowel sounds are normal.  Soft and nontender. Gravid uterus felt appropriate for dates. Fetal movement is felt by myself during abdominal examination. Extremities:  Full range of motion  Skin:   No rashes or lesions to exposed skin  Back:   No CVA tenderness  Neuro:  Alert and OX3. Speech clear and appropriate. No upper/lower extremity weakness. Normal sensation in all extremities. No facial asymmetry or weakness. Gait normal.  Psych:   Affect normal. Mood normal        RADIOLOGY:      LAB  Labs Reviewed   URINALYSIS - Abnormal; Notable for the following components:       Result Value    Clarity, UA CLOUDY (*)     Ketones, Urine TRACE (*)     All other components within normal limits    Narrative:     Performed at:  [place] [address]   Phone [phone]   MICROSCOPIC URINALYSIS - Abnormal; Notable for the following components:    Epithelial Cells, UA 21-50 (*)     Bacteria, UA 2+ (*)     Crystals, UA 1+ Ca. Oxalate (*)     All other components within normal limits    Narrative:     Performed at:  [place] [address]   Phone [phone]       ED COURSE / MDM:  71-year-old female, 34 weeks pregnant with complaints of nausea and 3 episodes of vomiting since last night after eating a sandwich from [employer]. She last vomited more than 6 hours ago. No diarrhea or abdominal pain. No vaginal bleeding. No urinary symptoms. She had an ultrasound yesterday that showed good fetal heart activity with a 29-week 4-day IUP.   Because she has a history of some poor fetal growth in the second trimester she is scheduled for a repeat ultrasound in 2 weeks. She called her OB/GYN doctor at [address] [place] [address] who suggested she go to the nearest ER. She is afebrile with normal vital signs. Bowel sounds are normal.  Gravid uterus felt appropriate for dates with fetal movement felt. Patient was given a dose of Zofran. Urinalysis showed trace ketones with no evidence of UTI. Patient drank Gatorade while here and had no vomiting or diarrhea. Likely this is secondary to the food she ate. She is stable for discharge with a prescription for Zofran. Recommended she follow-up tomorrow with her OB/GYN doctor. I discussed with Noelle Wallis the results of the evaluation in the Emergency Department, diagnosis, care, prognosis and the importance of follow-up. The patient is stable for discharge. The patient and/or family are in agreement with the plan and all questions have been answered. Specific discharge instructions were explained, including reasons to return to the emergency department.       (Please note that portions of this note may have been completed with a voice recognition program.  Efforts were made to edit the dictation but occasionally words are mis-transcribed)        FINAL IMPRESSION:  1 --nausea with vomiting  2 --second trimester pregnancy                Pavithra Urena MD  06/04/21 5313

## 2021-08-30 ENCOUNTER — HOSPITAL ENCOUNTER (EMERGENCY)
Age: 26
Discharge: HOME | End: 2021-08-30
Attending: EMERGENCY MEDICINE
Payer: COMMERCIAL

## 2021-08-30 VITALS
WEIGHT: 241 LBS | OXYGEN SATURATION: 100 % | DIASTOLIC BLOOD PRESSURE: 69 MMHG | HEIGHT: 64 IN | HEART RATE: 69 BPM | BODY MASS INDEX: 41.15 KG/M2 | RESPIRATION RATE: 16 BRPM | SYSTOLIC BLOOD PRESSURE: 128 MMHG | TEMPERATURE: 98.2 F

## 2021-08-30 DIAGNOSIS — Z51.89 VISIT FOR WOUND CHECK: Primary | ICD-10-CM

## 2021-08-30 PROCEDURE — 99283 EMERGENCY DEPT VISIT LOW MDM: CPT

## 2021-08-30 PROCEDURE — 6370000000 HC RX 637 (ALT 250 FOR IP): Performed by: EMERGENCY MEDICINE

## 2021-08-30 RX ORDER — CEPHALEXIN 500 MG/1
500 CAPSULE ORAL 4 TIMES DAILY
Qty: 28 CAPSULE | Refills: 0 | Status: SHIPPED | OUTPATIENT
Start: 2021-08-30 | End: 2021-09-06

## 2021-08-30 RX ORDER — CEPHALEXIN 500 MG/1
500 CAPSULE ORAL ONCE
Status: COMPLETED | OUTPATIENT
Start: 2021-08-30 | End: 2021-08-30

## 2021-08-30 RX ADMIN — CEPHALEXIN 500 MG: 500 CAPSULE ORAL at 01:42

## 2021-08-30 NOTE — ED PROVIDER NOTES
[address] [facility]  eMERGENCY dEPARTMENT eNCOUnter      Pt Name: Gordon Hodgkins  MRN: [id number]  [place] 1995  Date of evaluation: 2021  Provider: Stefan Batres MD  PCP: No primary care provider on file. CHIEF COMPLAINT       Chief Complaint   Patient presents with    Wound Check     Pt with concern about infection to  from the . States she went about 1 week ago for drainage to right side of incision site but was sent home with wound care instructions. States it's not getting better. HISTORY OFPRESENT ILLNESS   (Location/Symptom, Timing/Onset, Context/Setting, Quality, Duration, Modifying Factors,Severity)  Note limiting factors. Gordon Hodgkins is a 23 y.o. female had a  on the  of this month at [facility] went about a week ago because of some drainage to the right side of the incision sent home with wound care instructions states she feels like is not getting any better she denies any fevers or chills says it is draining some fluid    Nursing Notes were all reviewed and agreed with or any disagreements were addressed  in the HPI. REVIEW OF SYSTEMS    (2-9 systems for level 4, 10 or more for level 5)     Review of Systems    Positives and Pertinent negatives as per HPI. Except as noted above in the ROS, all other systems were reviewed andnegative. PASTMEDICAL HISTORY     Past Medical History:   Diagnosis Date    Genital herpes          SURGICAL HISTORY       Past Surgical History:   Procedure Laterality Date    TONSILLECTOMY           CURRENT MEDICATIONS       Previous Medications    ONDANSETRON (ZOFRAN ODT) 4 MG DISINTEGRATING TABLET    Take 1 tablet by mouth every 8 hours as needed for Nausea or Vomiting May Sub regular tablet (non-ODT) if insurance does not cover ODT.     PRENATAL VIT-FE FUMARATE-FA (PRENATAL VITAMIN) 27-0.8 MG TABS    TAKE 1 TABLET BY MOUTH EVERY DAY       ALLERGIES     Patient has no known allergies. FAMILY HISTORY     History reviewed. No pertinent family history. SOCIAL HISTORY       Social History     Socioeconomic History    Marital status: Single     Spouse name: None    Number of children: None    Years of education: None    Highest education level: None   Occupational History    None   Tobacco Use    Smoking status: Former Smoker     Packs/day: 0.50     Types: Cigarettes    Smokeless tobacco: Never Used   Substance and Sexual Activity    Alcohol use: Not Currently     Comment: occas    Drug use: Not Currently     Types: Marijuana    Sexual activity: Yes     Partners: Male   Other Topics Concern    None   Social History Narrative    None     Social Determinants of Health     Financial Resource Strain:     Difficulty of Paying Living Expenses:    Food Insecurity:     Worried About Running Out of Food in the Last Year:     Ran Out of Food in the Last Year:    Transportation Needs:     Lack of Transportation (Medical):  Lack of Transportation (Non-Medical):    Physical Activity:     Days of Exercise per Week:     Minutes of Exercise per Session:    Stress:     Feeling of Stress :    Social Connections:     Frequency of Communication with Friends and Family:     Frequency of Social Gatherings with Friends and Family:     Attends Jainism Services:     Active Member of Clubs or Organizations:     Attends Club or Organization Meetings:     Marital Status:    Intimate Partner Violence:     Fear of Current or Ex-Partner:     Emotionally Abused:     Physically Abused:     Sexually Abused:        SCREENINGS      @FLOW([id number])@      PHYSICAL EXAM    (up to 7 for level 4, 8 or more for level 5)     ED Triage Vitals [08/30/21 0135]   BP Temp Temp Source Pulse Resp SpO2 Height Weight   128/69 98.2 °F (36.8 °C) Oral 69 16 100 % 5' 4\" (1.626 m) 241 lb (109.3 kg)       Physical Exam      General Appearance:  Alert, cooperative, no distress, appears stated age. Head:  Normocephalic, without obviousabnormality, atraumatic. Eyes:  conjunctiva/corneas clear, EOM's intact. Sclera anicteric. ENT: Mucous membranes moist.   Neck: Supple, symmetrical, trachea midline, no adenopathy. No jugular venous distention. Lungs:   Clear to auscultation bilaterally, respirationsunlabored. No rales, rhonchi or wheezes. Chest Wall:  No tenderness. Heart:  Regular rate and rhythm, S1 and S2 normal, no murmur, rub or gallop. Abdomen:   Soft, non-tender, bowel sounds active,   no masses, no organomegaly.  wound edges appear well approximated there is a small area on the right side with a small amount of dehiscence with some serosanguineous fluid leaking the wound edges are not erythematous they are not warm I see no drainage of purulent material I feel no firmness or fluctuance   Extremities: No edema, cords or calf tenderness. Full range of motion. Pulses: 2+ and symmetric   Skin: Turgor is normal, no rashes or lesions. Neurologic: Alert and oriented X 3. No focal findings. Motor grossly normal.  Speech clear, no drift, CN III-XII grossly intact,        DIAGNOSTIC RESULTS   LABS:    Labs Reviewed - No data to display    All other labs were within normal range or not returned as of this dictation. EKG: All EKG's are interpreted by the Emergency Department Physician who eithersigns or Co-signs this chart in the absence of a cardiologist.        RADIOLOGY:   Non-plain film images such as CT, Ultrasound and MRI are read by the radiologist. Plain radiographic images are visualized by myself.       *    Interpretation per the Radiologist below, if available at the time of this note:    No orders to display         PROCEDURES   Unless otherwise noted below, none     Procedures    *    CRITICAL CARE TIME   N/A      EMERGENCY DEPARTMENT COURSE and DIFFERENTIALDIAGNOSIS/MDM:   Vitals:    Vitals:    21 0135   BP: 128/69   Pulse: 69   Resp: 16   Temp: 98.2 °F (36.8 °C)   TempSrc: Oral   SpO2: 100%   Weight: 241 lb (109.3 kg)   Height: 5' 4\" (1.626 m)       Patient was given thefollowing medications:  Medications   cephALEXin (KEFLEX) capsule 500 mg (has no administration in time range)           The patient tolerated their visit well. The patient and / or the familywere informed of the results of any tests, a time was given to answer questions. FINAL IMPRESSION      1.  Visit for wound check          DISPOSITION/PLAN   DISPOSITION Decision To Discharge 08/30/2021 01:38:10 AM  Not entirely convinced that this is infection as much as it is fat necrosis and serosanguineous fluid as the patient is obese plan for Keflex and instructions to follow-up with OB/GYN at [facility] where she got her procedure    PATIENT REFERRED TO:  your OB/GYN    In 2 days        DISCHARGE MEDICATIONS:  New Prescriptions    CEPHALEXIN (KEFLEX) 500 MG CAPSULE    Take 1 capsule by mouth 4 times daily for 7 days       DISCONTINUED MEDICATIONS:  Discontinued Medications    No medications on file              (Please note that portions of this note were completed with a voice recognition program.  Efforts were made to edit the dictations but occasionally words are mis-transcribed.)    Harshil Wharton MD (electronically signed)     Harshil Wharton MD  08/30/21 9205

## 2021-11-23 NOTE — ED NOTES
Patient verbalized understanding of d/c instructions. Patient given scripts x 1. Patient left the ED and instructed on follow up.         Jerardo Melton RN  02/29/20 5753 Pt lvm stating the ENT she was referred to doesn't accept her insurance and she will need to be referred somewhere else.

## 2021-12-31 ENCOUNTER — HOSPITAL ENCOUNTER (EMERGENCY)
Age: 26
Discharge: HOME | End: 2021-12-31
Attending: EMERGENCY MEDICINE
Payer: COMMERCIAL

## 2021-12-31 VITALS
OXYGEN SATURATION: 99 % | RESPIRATION RATE: 20 BRPM | WEIGHT: 252.87 LBS | TEMPERATURE: 99.2 F | HEIGHT: 64 IN | BODY MASS INDEX: 43.17 KG/M2 | HEART RATE: 98 BPM | SYSTOLIC BLOOD PRESSURE: 132 MMHG | DIASTOLIC BLOOD PRESSURE: 83 MMHG

## 2021-12-31 DIAGNOSIS — Z32.01 POSITIVE PREGNANCY TEST: ICD-10-CM

## 2021-12-31 DIAGNOSIS — R09.81 NASAL CONGESTION: Primary | ICD-10-CM

## 2021-12-31 DIAGNOSIS — Z20.822 PERSON UNDER INVESTIGATION FOR COVID-19: ICD-10-CM

## 2021-12-31 LAB
BILIRUBIN URINE: NEGATIVE
BLOOD, URINE: NEGATIVE
CLARITY: CLEAR
COLOR: YELLOW
GLUCOSE URINE: NEGATIVE MG/DL
HCG(URINE) PREGNANCY TEST: POSITIVE
KETONES, URINE: NEGATIVE MG/DL
LEUKOCYTE ESTERASE, URINE: NEGATIVE
MICROSCOPIC EXAMINATION: NORMAL
NITRITE, URINE: NEGATIVE
PH UA: 7 (ref 5–8)
PROTEIN UA: NEGATIVE MG/DL
RAPID INFLUENZA  B AGN: NEGATIVE
RAPID INFLUENZA A AGN: NEGATIVE
SPECIFIC GRAVITY UA: 1.02 (ref 1–1.03)
URINE REFLEX TO CULTURE: NORMAL
URINE TYPE: NORMAL
UROBILINOGEN, URINE: 0.2 E.U./DL

## 2021-12-31 PROCEDURE — 81003 URINALYSIS AUTO W/O SCOPE: CPT

## 2021-12-31 PROCEDURE — 99283 EMERGENCY DEPT VISIT LOW MDM: CPT

## 2021-12-31 PROCEDURE — U0003 INFECTIOUS AGENT DETECTION BY NUCLEIC ACID (DNA OR RNA); SEVERE ACUTE RESPIRATORY SYNDROME CORONAVIRUS 2 (SARS-COV-2) (CORONAVIRUS DISEASE [COVID-19]), AMPLIFIED PROBE TECHNIQUE, MAKING USE OF HIGH THROUGHPUT TECHNOLOGIES AS DESCRIBED BY CMS-2020-01-R: HCPCS

## 2021-12-31 PROCEDURE — 6370000000 HC RX 637 (ALT 250 FOR IP): Performed by: EMERGENCY MEDICINE

## 2021-12-31 PROCEDURE — U0005 INFEC AGEN DETEC AMPLI PROBE: HCPCS

## 2021-12-31 PROCEDURE — 87804 INFLUENZA ASSAY W/OPTIC: CPT

## 2021-12-31 PROCEDURE — 84703 CHORIONIC GONADOTROPIN ASSAY: CPT

## 2021-12-31 RX ORDER — ACETAMINOPHEN 500 MG
1000 TABLET ORAL 4 TIMES DAILY PRN
Qty: 180 TABLET | Refills: 0 | Status: SHIPPED | OUTPATIENT
Start: 2021-12-31

## 2021-12-31 RX ORDER — PNV NO.95/FERROUS FUM/FOLIC AC 28MG-0.8MG
1 TABLET ORAL DAILY
Qty: 30 TABLET | Refills: 5 | Status: SHIPPED | OUTPATIENT
Start: 2021-12-31

## 2021-12-31 RX ORDER — ACETAMINOPHEN 500 MG
1000 TABLET ORAL ONCE
Status: COMPLETED | OUTPATIENT
Start: 2021-12-31 | End: 2021-12-31

## 2021-12-31 RX ADMIN — ACETAMINOPHEN 1000 MG: 500 TABLET ORAL at 19:37

## 2021-12-31 ASSESSMENT — PAIN SCALES - GENERAL
PAINLEVEL_OUTOF10: 6
PAINLEVEL_OUTOF10: 6

## 2021-12-31 ASSESSMENT — PAIN DESCRIPTION - LOCATION: LOCATION: GENERALIZED

## 2021-12-31 ASSESSMENT — PAIN DESCRIPTION - PAIN TYPE: TYPE: ACUTE PAIN

## 2021-12-31 ASSESSMENT — PAIN DESCRIPTION - DESCRIPTORS: DESCRIPTORS: ACHING

## 2022-01-01 LAB — SARS-COV-2: DETECTED

## 2022-01-01 NOTE — ED PROVIDER NOTES
[address] [facility]  Chief Complaint   Patient presents with    Pregnancy Test     LMP 11/2/21--asking for pregnancy test.    also reports feeling sick since this am.  reports runny nose, body aches, fever 101 today. body aches and back pain at 6.       Concern For COVID-19       HISTORY OF PRESENT ILLNESS  Asim Malik is a 23 y.o. female who presents to the ED complaining of about 1 day of malaise fatigue body aches coughing nasal congestion and some sore throat. Had a fever of 101 at home. She says that her cough is mostly from nasal congestion and does not feel like anything has settled into her chest at this point. She does not really feel short of breath or have any chest pains. No abdominal pains nausea vomiting or diarrhea. She did not try any medicines today for her symptoms. Secondarily she thinks she might be pregnant but is not sure, LMP was November 2, she took an indeterminate urine pregnancy test at home and so is not sure if this will be positive or not. That being said she has no pelvic pain vaginal bleeding or discharge, no dysuria or hematuria. No other complaints, modifying factors or associated symptoms. Nursing notes reviewed. Past Medical History:   Diagnosis Date    Asthma     Genital herpes      Past Surgical History:   Procedure Laterality Date    TONSILLECTOMY       History reviewed. No pertinent family history.   Social History     Socioeconomic History    Marital status: Single     Spouse name: Not on file    Number of children: Not on file    Years of education: Not on file    Highest education level: Not on file   Occupational History    Not on file   Tobacco Use    Smoking status: Former Smoker     Packs/day: 0.50     Types: Cigarettes    Smokeless tobacco: Never Used   Substance and Sexual Activity    Alcohol use: Not Currently     Comment: occas    Drug use: Not Currently     Types: Marijuana Deboraha Sanmckay)  Sexual activity: Yes     Partners: Male   Other Topics Concern    Not on file   Social History Narrative    Not on file     Social Determinants of Health     Financial Resource Strain:     Difficulty of Paying Living Expenses: Not on file   Food Insecurity:     Worried About Running Out of Food in the Last Year: Not on file    Tone of Food in the Last Year: Not on file   Transportation Needs:     Lack of Transportation (Medical): Not on file    Lack of Transportation (Non-Medical): Not on file   Physical Activity:     Days of Exercise per Week: Not on file    Minutes of Exercise per Session: Not on file   Stress:     Feeling of Stress : Not on file   Social Connections:     Frequency of Communication with Friends and Family: Not on file    Frequency of Social Gatherings with Friends and Family: Not on file    Attends Protestant Services: Not on file    Active Member of [address] [place] [address] [employer] or Organizations: Not on file    Attends Club or Organization Meetings: Not on file    Marital Status: Not on file   Intimate Partner Violence:     Fear of Current or Ex-Partner: Not on file    Emotionally Abused: Not on file    Physically Abused: Not on file    Sexually Abused: Not on file   Housing Stability:     Unable to Pay for Housing in the Last Year: Not on file    Number of [place] in the Last Year: Not on file    Unstable Housing in the Last Year: Not on file     No current facility-administered medications for this encounter.      Current Outpatient Medications   Medication Sig Dispense Refill    acetaminophen (TYLENOL) 500 MG tablet Take 2 tablets by mouth 4 times daily as needed for Pain or Fever 180 tablet 0    Prenatal Vit-Fe Fumarate-FA (PRENATAL VITAMIN) 27-0.8 MG TABS Take 1 tablet by mouth daily 30 tablet 5     No Known Allergies    REVIEW OF SYSTEMS  6 systems reviewed, pertinent positives per HPI otherwise noted to be negative    PHYSICAL EXAM   /83   Pulse 98   Temp 99.2 °F (37.3 °C) (Oral)   Resp 20   Ht 5' 4\" (1.626 m)   Wt 252 lb 13.9 oz (114.7 kg)   LMP 11/02/2021 (Approximate)   SpO2 99%   BMI 43.40 kg/m²    GENERAL APPEARANCE: Awake and alert. Cooperative. No acute distress. HEAD: Normocephalic. Atraumatic. EYES: PERRL. EOM's grossly intact. ENT: Mucous membranes are moist.  Nasal congestion noted  NECK: Supple. Normal ROM. CHEST: Equal symmetric chest rise. RRR  LUNGS: Breathing is unlabored. Speaking comfortably in full sentences. CTAB, no wheezing rhonchi or rales, minimal cough noted. ABDOMEN: Nondistended, nontender, no CVAT bilaterally. EXTREMITIES: MAEE. No acute deformities. SKIN: Warm and dry. No acute rashes. NEUROLOGICAL: Alert and oriented. Strength is 5/5 in all extremities and sensation is intact. LABS  I have reviewed all labs for this visit. Results for orders placed or performed during the hospital encounter of 12/31/21   Rapid influenza A/B antigens    Specimen: Nasopharyngeal   Result Value Ref Range    Rapid Influenza A Ag Negative Negative    Rapid Influenza B Ag Negative Negative   Urinalysis Reflex to Culture    Specimen: Urine, clean catch   Result Value Ref Range    Color, UA Yellow Straw/Yellow    Clarity, UA Clear Clear    Glucose, Ur Negative Negative mg/dL    Bilirubin Urine Negative Negative    Ketones, Urine Negative Negative mg/dL    Specific Gravity, UA 1.020 1.005 - 1.030    Blood, Urine Negative Negative    pH, UA 7.0 5.0 - 8.0    Protein, UA Negative Negative mg/dL    Urobilinogen, Urine 0.2 <2.0 E.U./dL    Nitrite, Urine Negative Negative    Leukocyte Esterase, Urine Negative Negative    Microscopic Examination Not Indicated     Urine Type NotGiven     Urine Reflex to Culture Not Indicated    Pregnancy, Urine   Result Value Ref Range    HCG(Urine) Pregnancy Test POSITIVE Detects HCG level >20 MIU/mL     ED COURSE/MDM  The patient's ED course was notable for viral syndrome, fairly mild, given tylenol and fever improving. Lungs clear, no indication for abx or CXR at this point. Incidentally noted is a negative urinalysis for positive pregnancy test.  No emergent ultrasound is indicated given lack of vaginal or pelvic complaints. She was referred to OB/GYN for prenatal care and follow-up. RTED for VB/belly pain. Recommended Tylenol only for fevers and discomfort for now as well. Covid PCR pending, told to presume positivity until results are known. Told how to follow-up on these results and made a primary care referral for her as well. Rapid flu negative. Lungs clear, no indication for CXR or abx at this point. Patient was given scripts for the following medications. I counseled patient how to take these medications. New Prescriptions    ACETAMINOPHEN (TYLENOL) 500 MG TABLET    Take 2 tablets by mouth 4 times daily as needed for Pain or Fever    PRENATAL VIT-FE FUMARATE-FA (PRENATAL VITAMIN) 27-0.8 MG TABS    Take 1 tablet by mouth daily         CLINICAL IMPRESSION  1. Nasal congestion    2. Positive pregnancy test    3. Person under investigation for COVID-19        Blood pressure 132/83, pulse 98, temperature 99.2 °F (37.3 °C), temperature source Oral, resp. rate 20, height 5' 4\" (1.626 m), weight 252 lb 13.9 oz (114.7 kg), last menstrual period 11/02/2021, SpO2 99 %, unknown if currently breastfeeding. DISPOSITION    I have discussed the findings of today's workup with the patient and addressed the patient's questions and concerns. Important warning signs as well as new or worsening symptoms which would necessitate immediate return to the ED were discussed. The plan is to discharge from the ED at this time, and the patient is in stable condition. The patient acknowledged understanding is agreeable with this plan.       Follow-up with:  [place] [address] [address] [facility]   Schedule an appointment as soon as possible for a visit in 1 week  For symptom re-evaluation and prenatal care    [facility]) Pre-Services  [address]  [facility] [place] [address]  [phone]  Go to   If symptoms worsen      This chart was created using Dragon dictation software. Efforts were made by me to ensure accuracy, however some errors may be present due to limitations of this technology.         Radha Thomas MD  12/31/21 3692

## 2022-01-01 NOTE — ED NOTES
LMP 11/2/21--asking for pregnancy test.    also reports feeling sick since this am.  reports runny nose, body aches, fever 101 today. body aches and back pain at 6.         Larry Stearns RN  12/31/21 4383

## 2022-01-19 NOTE — ED NOTES
Discharge instructions with pt. COVID/COVID quarantine teaching with pt. Back pain/body aches at 2. Explained rx's. Explained importance of having a family doctor for regular medical care. Explained how to get a family doctor. [facility] info sheet given.  [facility] clinic list given. [facility] referral line given. SHARONA Puckett , phone number listed in case pt needs any further assistance.      Yara Reagan RN  01/19/22 6714

## 2024-11-29 ENCOUNTER — HOSPITAL ENCOUNTER (EMERGENCY)
Age: 29
Discharge: HOME | End: 2024-11-29
Attending: EMERGENCY MEDICINE
Payer: COMMERCIAL

## 2024-11-29 VITALS
HEART RATE: 78 BPM | WEIGHT: 215.6 LBS | HEIGHT: 64 IN | BODY MASS INDEX: 36.81 KG/M2 | TEMPERATURE: 98 F | DIASTOLIC BLOOD PRESSURE: 113 MMHG | OXYGEN SATURATION: 98 % | SYSTOLIC BLOOD PRESSURE: 157 MMHG | RESPIRATION RATE: 17 BRPM

## 2024-11-29 DIAGNOSIS — K08.89 PAIN, DENTAL: Primary | ICD-10-CM

## 2024-11-29 PROCEDURE — 6370000000 HC RX 637 (ALT 250 FOR IP): Performed by: EMERGENCY MEDICINE

## 2024-11-29 PROCEDURE — 99283 EMERGENCY DEPT VISIT LOW MDM: CPT

## 2024-11-29 RX ORDER — HYDROCODONE BITARTRATE AND ACETAMINOPHEN 5; 325 MG/1; MG/1
1 TABLET ORAL ONCE
Status: COMPLETED | OUTPATIENT
Start: 2024-11-29 | End: 2024-11-29

## 2024-11-29 RX ORDER — AMOXICILLIN 250 MG/1
500 CAPSULE ORAL ONCE
Status: COMPLETED | OUTPATIENT
Start: 2024-11-29 | End: 2024-11-29

## 2024-11-29 RX ORDER — HYDROCODONE BITARTRATE AND ACETAMINOPHEN 5; 325 MG/1; MG/1
1 TABLET ORAL EVERY 4 HOURS PRN
Qty: 8 TABLET | Refills: 0 | Status: SHIPPED | OUTPATIENT
Start: 2024-11-29 | End: 2024-12-02

## 2024-11-29 RX ORDER — AMOXICILLIN 500 MG/1
500 CAPSULE ORAL 3 TIMES DAILY
Qty: 30 CAPSULE | Refills: 0 | Status: SHIPPED | OUTPATIENT
Start: 2024-11-29 | End: 2024-12-09

## 2024-11-29 RX ADMIN — HYDROCODONE BITARTRATE AND ACETAMINOPHEN 1 TABLET: 5; 325 TABLET ORAL at 21:27

## 2024-11-29 RX ADMIN — AMOXICILLIN 500 MG: 250 CAPSULE ORAL at 21:27

## 2024-11-29 ASSESSMENT — PAIN DESCRIPTION - ORIENTATION: ORIENTATION: RIGHT;UPPER

## 2024-11-29 ASSESSMENT — PAIN DESCRIPTION - LOCATION: LOCATION: MOUTH

## 2024-11-29 ASSESSMENT — PAIN SCALES - GENERAL: PAINLEVEL_OUTOF10: 10

## 2024-11-30 NOTE — DISCHARGE INSTRUCTIONS
"Chief Complaint   Patient presents with     Pre-Op Exam       Initial /70  Pulse 86  Temp 97.2  F (36.2  C) (Tympanic)  Resp 16  Wt 185 lb 8 oz (84.1 kg)  SpO2 99%  BMI 24.14 kg/m2 Estimated body mass index is 24.14 kg/(m^2) as calculated from the following:    Height as of 6/8/17: 6' 1.5\" (1.867 m).    Weight as of this encounter: 185 lb 8 oz (84.1 kg).  Medication Reconciliation: complete   Jazlyn Lazar CMA    " Toothache    Toothaches are generally caused by tooth decay.  If you have severe pain or swelling around a tooth, you may have a deep tooth infection.  Tooth decay and infections require evaluation and treatment by a dentist or an oral surgeon.    The emergency department will provide you with the best possible care available for your dental problem.  Unfortunately, there is not a dentist or dental clinic available in the department.  Routine dental care, such as fillings, tooth extractions, or dayo canals are not available in the emergency department.  However, we are avaialbe for emergencies including abscesses, fractures of the jaw and other oral trauma such as a tooth that is knocked out.  Any other dental problem is best treated by a dentist.  Please see the list of dental clinics in the area if you do not currently have a dentist.      Treatment That Can Be Provided for Toothache in the Emergency Department    If you have a severe toothache, medication may be prescribed for you until you are able to see a dentist.  If you are given an antibiotic, take it as prescribed and continue to take it until gone.  Even if you start to feel better, your toothache will need to be treated by a dentist or an oral surgeon.    Pain medication may be prescribed for you.  As is the policy of the [facility], the emergency department uses nonsteroidal anti-inflammatory medication (NSAIDs) as the primary medication for pain.  These may include ibuprofen (Motrin®) or naproxyn sodium (Naprosyn®).    Patients who are unable to take nonsteroidal anti-inflammatory medications will generally be advised to take acetaminophen (Tylenol®) for dental pain.    As is the policy of the [facility] dental clinics, the [facility] emergency department policy strongly discourages the use of the narcotic medications. (Tylenol #3®, Vicodin®, etc) are restricted by specific, strict guidelines.    Consult your care  giver regarding these instructions and seek your physician’s advice regarding medical care.        Dental Referrals  Following is a list of local clinics that treat dental problems.  Many also have specific emergency hours.  For an appointment or for hours of operation, contact the clinic.    General Information    [facility]  [phone]     [facility] [place]  [address]  [place], [place]  [phone]    [facility] (referral agency)  [address], [address]  [place] [address] [phone] or [phone]  (Application Process, Must Qualify)     [facility] [address], [place] [address] [phone]  ([facility] Medicaid and Insurance with Payment plans for Self-Insured)     Clinics:    [facility] [place]   [address] [place], [place] [id number] [phone]     [facility] [place]  [address] [place], [place] [address] [phone]    [facility]  [address]  [place], [place] [id number] [phone]     [facility] [place]  [address] [place], [place] [id number] [phone]     [facility] Department Clinics:    [facility]  Appointment only  [address]  [facility], [address]  [phone]  [place] [employer] [place] [employer] [employer]  [phone]    [facility]  [address] [place] [address]  [phone]     [facility]  [address] [place] [address]  [phone]    [facility]  [address] [place], [place] [address] [phone]      [facility] Department Clinics    [facility]  [address] [place] [address]  [phone]     [facility]  [address] [place], [place] [address] [phone]    [facility]  [address]  [phone]     [facility]

## 2024-11-30 NOTE — ED NOTES
AAOx4, NAD, resp e/u on RA, extra list of dental clinics provided as resource, calling [employer] for ride home.

## 2024-11-30 NOTE — ED PROVIDER NOTES
[facility] EMERGENCY DEPARTMENT  EMERGENCY DEPARTMENT ENCOUNTER        Pt Name: Vic Somers  MRN: [id number]  Birthdate 1995  Date of evaluation: 11/29/2024  Provider: Jess Anderson MD  PCP: No primary care provider on file.  Note Started: 9:21 PM EST 11/29/24    CHIEF COMPLAINT       Chief Complaint   Patient presents with    Dental Pain     Pt to ED c/o Right Upper dental pain the past month, attempted to see dentist for emergency tooth removal but they didn't have anything available for scheduling so has been dealing with the pain at home. Taking some medicines at home without relief. AAOx4, NAD, resp e/u on RA.       HISTORY OF PRESENT ILLNESS: 1 or more Elements     History from : Patient    Limitations to history : None    Vic Somers is a 29 y.o. female who presents to the emergency department with dental pain.  Patient states she has been having some right upper dental pain for the past month.  She states she only has half of her right upper wisdom tooth.  She states that she believes this has been affecting her tooth in front of this as well and states that there is a hole there.  She has not seen a dentist.  She states it really started hurting yesterday and she really did not get a lot of sleep.  She has been doing Tylenol.  She denies any drainage or fever or chills    Nursing Notes were all reviewed and agreed with or any disagreements were addressed in the HPI.    REVIEW OF SYSTEMS :      Review of Systems    5 systems reviewed and negative except as in HPI/MDM    SURGICAL HISTORY     Past Surgical History:   Procedure Laterality Date    TONSILLECTOMY         CURRENTMEDICATIONS       Discharge Medication List as of 11/29/2024  9:29 PM        CONTINUE these medications which have NOT CHANGED    Details   acetaminophen (TYLENOL) 500 MG tablet Take 2 tablets by mouth 4 times daily as needed for Pain or Fever, Disp-180 tablet, R-0Print      Prenatal Vit-Fe Fumarate-FA (PRENATAL  specified.    DISCHARGE MEDICATIONS:  Discharge Medication List as of 11/29/2024  9:29 PM        START taking these medications    Details   amoxicillin (AMOXIL) 500 MG capsule Take 1 capsule by mouth 3 times daily for 10 days, Disp-30 capsule, R-0Normal      HYDROcodone-acetaminophen (NORCO) 5-325 MG per tablet Take 1 tablet by mouth every 4 hours as needed for Pain for up to 3 days. Intended supply: 3 days. Take lowest dose possible to manage pain Max Daily Amount: 6 tablets, Disp-8 tablet, R-0Normal             DISCONTINUED MEDICATIONS:  Discharge Medication List as of 11/29/2024  9:29 PM                 (Please note that portions of this note were completed with a voice recognition program.  Efforts were made to edit the dictations but occasionally words are mis-transcribed.)    Jess Anderson MD (electronically signed)            Jess Anderson MD  11/29/24 [id number]

## 2025-06-09 PROCEDURE — 99283 EMERGENCY DEPT VISIT LOW MDM: CPT

## 2025-06-10 ENCOUNTER — HOSPITAL ENCOUNTER (EMERGENCY)
Age: 30
Discharge: HOME | End: 2025-06-10
Attending: EMERGENCY MEDICINE
Payer: COMMERCIAL

## 2025-06-10 VITALS
OXYGEN SATURATION: 100 % | DIASTOLIC BLOOD PRESSURE: 98 MMHG | TEMPERATURE: 98.4 F | WEIGHT: 211.86 LBS | SYSTOLIC BLOOD PRESSURE: 144 MMHG | HEIGHT: 65 IN | BODY MASS INDEX: 35.3 KG/M2 | RESPIRATION RATE: 16 BRPM | HEART RATE: 64 BPM

## 2025-06-10 DIAGNOSIS — K04.7 DENTAL ABSCESS: ICD-10-CM

## 2025-06-10 DIAGNOSIS — K02.9 DENTAL CARIES: Primary | ICD-10-CM

## 2025-06-10 PROCEDURE — 6370000000 HC RX 637 (ALT 250 FOR IP): Performed by: EMERGENCY MEDICINE

## 2025-06-10 RX ORDER — CHLORHEXIDINE GLUCONATE ORAL RINSE 1.2 MG/ML
15 SOLUTION DENTAL 2 TIMES DAILY
Qty: 420 ML | Refills: 0 | Status: SHIPPED | OUTPATIENT
Start: 2025-06-10 | End: 2025-06-24

## 2025-06-10 RX ORDER — IBUPROFEN 800 MG/1
800 TABLET, FILM COATED ORAL ONCE
Status: COMPLETED | OUTPATIENT
Start: 2025-06-10 | End: 2025-06-10

## 2025-06-10 RX ORDER — IBUPROFEN 800 MG/1
800 TABLET, FILM COATED ORAL EVERY 8 HOURS PRN
Qty: 20 TABLET | Refills: 0 | Status: SHIPPED | OUTPATIENT
Start: 2025-06-10 | End: 2025-06-20

## 2025-06-10 RX ADMIN — AMOXICILLIN AND CLAVULANATE POTASSIUM 1 TABLET: 875; 125 TABLET, FILM COATED ORAL at 00:50

## 2025-06-10 RX ADMIN — IBUPROFEN 800 MG: 800 TABLET, FILM COATED ORAL at 00:50

## 2025-06-10 ASSESSMENT — PAIN DESCRIPTION - LOCATION: LOCATION: TEETH

## 2025-06-10 ASSESSMENT — PAIN SCALES - GENERAL: PAINLEVEL_OUTOF10: 10

## 2025-06-10 ASSESSMENT — PAIN DESCRIPTION - ONSET: ONSET: ON-GOING

## 2025-06-10 ASSESSMENT — PAIN DESCRIPTION - ORIENTATION: ORIENTATION: RIGHT

## 2025-06-10 ASSESSMENT — PAIN DESCRIPTION - PAIN TYPE: TYPE: ACUTE PAIN

## 2025-06-10 ASSESSMENT — PAIN DESCRIPTION - FREQUENCY: FREQUENCY: CONTINUOUS

## 2025-06-10 ASSESSMENT — PAIN DESCRIPTION - DESCRIPTORS: DESCRIPTORS: THROBBING

## 2025-06-10 NOTE — ED TRIAGE NOTES
Pt to ED c/o dental pain for the past week. Dental clinic told her she needs abx before she can be seen by them. Pt awake and alert.    F-none  E-replete prn  N-DASH/TLC CC diet  lovenox for DVT prophylaxis

## 2025-06-10 NOTE — ED PROVIDER NOTES
[facility] EMERGENCY DEPT VISIT      Patient Identification  Vic Somers is a 30 y.o. female.    Chief Complaint   Dental Pain      History of Present Illness:    History was obtained from patient.  Limitations to history: None  This is a  30 y.o. female who presents ambulatory  to the ED with complaints of right upper dental pain for the last week.  Patient states that she has been seen for these teeth in the past and was treated with antibiotics.  The pain did seem to get better and she tried to follow-up with [facility] to have them pulled however her daughter got sick on the day of the appointment so she missed it.  Pain started back again within the last week.  There has been no drainage.  It hurts to chew.  It is giving her some headaches.  No fever.  No trouble swallowing or trouble breathing.  She called ems to get again and since she was having pain they wanted her to come to the ER to get started on antibiotics and then will follow-up with her in the office..     Past Medical History:   Diagnosis Date    Asthma     Genital herpes        Past Surgical History:   Procedure Laterality Date    TONSILLECTOMY         No current facility-administered medications for this encounter.    Current Outpatient Medications:     amoxicillin-clavulanate (AUGMENTIN) 875-125 MG per tablet, Take 1 tablet by mouth 2 times daily for 10 days, Disp: 20 tablet, Rfl: 0    ibuprofen (IBU) 800 MG tablet, Take 1 tablet by mouth every 8 hours as needed for Pain, Disp: 20 tablet, Rfl: 0    chlorhexidine (PERIDEX) 0.12 % solution, Swish and spit 15 mLs 2 times daily for 14 days, Disp: 420 mL, Rfl: 0    acetaminophen (TYLENOL) 500 MG tablet, Take 2 tablets by mouth 4 times daily as needed for Pain or Fever, Disp: 180 tablet, Rfl: 0    No Known Allergies    Social History     Socioeconomic History    Marital status: Single     Spouse name: Not on file    Number of children: Not on file    Years of education: Not on file    Highest